# Patient Record
Sex: FEMALE | Race: WHITE | NOT HISPANIC OR LATINO | URBAN - METROPOLITAN AREA
[De-identification: names, ages, dates, MRNs, and addresses within clinical notes are randomized per-mention and may not be internally consistent; named-entity substitution may affect disease eponyms.]

---

## 2017-01-05 ENCOUNTER — TELEPHONE (OUTPATIENT)
Dept: TRANSPLANT | Facility: CLINIC | Age: 42
End: 2017-01-05

## 2017-01-05 DIAGNOSIS — Z00.5 TRANSPLANT DONOR EVALUATION: Primary | ICD-10-CM

## 2017-01-05 NOTE — TELEPHONE ENCOUNTER
Patient called, states she would like to proceed with testing. Required testing discussed and information to schedule appointments provided.     Patient will update pap smear and mammogram with local physician and have results faxed. All questions were answered and patient verbalized understanding.

## 2017-03-07 DIAGNOSIS — Z00.5 TRANSPLANT DONOR EVALUATION: Primary | ICD-10-CM

## 2017-03-08 ENCOUNTER — HOSPITAL ENCOUNTER (OUTPATIENT)
Dept: CARDIOLOGY | Facility: CLINIC | Age: 42
Discharge: HOME | End: 2017-03-08
Payer: COMMERCIAL

## 2017-03-08 ENCOUNTER — HOSPITAL ENCOUNTER (OUTPATIENT)
Dept: RADIOLOGY | Facility: HOSPITAL | Age: 42
Discharge: HOME | End: 2017-03-08
Attending: NURSE PRACTITIONER
Payer: COMMERCIAL

## 2017-03-08 DIAGNOSIS — Z00.5 TRANSPLANT DONOR EVALUATION: ICD-10-CM

## 2017-03-08 DIAGNOSIS — Z00.5 TRANSPLANT DONOR EVALUATION: Primary | ICD-10-CM

## 2017-03-08 LAB
DIASTOLIC DYSFUNCTION: NO
RETIRED EF AND QEF - SEE NOTES: 60 (ref 55–65)

## 2017-03-08 PROCEDURE — 78707 K FLOW/FUNCT IMAGE W/O DRUG: CPT | Mod: 26,,, | Performed by: NUCLEAR MEDICINE

## 2017-03-08 PROCEDURE — A9562 TC99M MERTIATIDE: HCPCS

## 2017-03-08 PROCEDURE — 93321 DOPPLER ECHO F-UP/LMTD STD: CPT | Mod: S$GLB,,, | Performed by: INTERNAL MEDICINE

## 2017-03-08 PROCEDURE — 71020 XR CHEST PA AND LATERAL: CPT | Mod: TC

## 2017-03-08 PROCEDURE — 93351 STRESS TTE COMPLETE: CPT | Mod: S$GLB,,, | Performed by: INTERNAL MEDICINE

## 2017-03-08 PROCEDURE — 71020 XR CHEST PA AND LATERAL: CPT | Mod: 26,,, | Performed by: RADIOLOGY

## 2017-03-09 ENCOUNTER — LAB VISIT (OUTPATIENT)
Dept: LAB | Facility: HOSPITAL | Age: 42
End: 2017-03-09
Attending: NURSE PRACTITIONER
Payer: COMMERCIAL

## 2017-03-09 ENCOUNTER — OFFICE VISIT (OUTPATIENT)
Dept: TRANSPLANT | Facility: CLINIC | Age: 42
End: 2017-03-09
Payer: COMMERCIAL

## 2017-03-09 ENCOUNTER — DOCUMENTATION ONLY (OUTPATIENT)
Dept: TRANSPLANT | Facility: CLINIC | Age: 42
End: 2017-03-09

## 2017-03-09 ENCOUNTER — OFFICE VISIT (OUTPATIENT)
Dept: PSYCHIATRY | Facility: CLINIC | Age: 42
End: 2017-03-09
Payer: COMMERCIAL

## 2017-03-09 VITALS
BODY MASS INDEX: 27.46 KG/M2 | OXYGEN SATURATION: 100 % | DIASTOLIC BLOOD PRESSURE: 78 MMHG | WEIGHT: 155 LBS | RESPIRATION RATE: 16 BRPM | HEART RATE: 68 BPM | SYSTOLIC BLOOD PRESSURE: 115 MMHG | TEMPERATURE: 98 F | HEIGHT: 63 IN

## 2017-03-09 DIAGNOSIS — F31.9 BIPOLAR I DISORDER, MOST RECENT EPISODE (OR CURRENT) UNSPECIFIED: ICD-10-CM

## 2017-03-09 DIAGNOSIS — F42.9 OBSESSIVE-COMPULSIVE DISORDER, UNSPECIFIED TYPE: ICD-10-CM

## 2017-03-09 DIAGNOSIS — Z52.4 KIDNEY DONOR: ICD-10-CM

## 2017-03-09 DIAGNOSIS — E66.3 OVERWEIGHT (BMI 25.0-29.9): ICD-10-CM

## 2017-03-09 DIAGNOSIS — Z00.5 TRANSPLANT DONOR EVALUATION: Primary | ICD-10-CM

## 2017-03-09 DIAGNOSIS — Z00.5 TRANSPLANT DONOR EVALUATION: ICD-10-CM

## 2017-03-09 DIAGNOSIS — F31.9 BIPOLAR 1 DISORDER: ICD-10-CM

## 2017-03-09 DIAGNOSIS — R76.8 HCV ANTIBODY POSITIVE: ICD-10-CM

## 2017-03-09 DIAGNOSIS — Z01.818 PRE-OP EXAM: Primary | ICD-10-CM

## 2017-03-09 PROCEDURE — 99205 OFFICE O/P NEW HI 60 MIN: CPT | Mod: S$GLB,,, | Performed by: INTERNAL MEDICINE

## 2017-03-09 PROCEDURE — 96102 PR PSYCHOLOGIC TESTING BY TECHNICIAN: CPT | Mod: 59,S$GLB,, | Performed by: PSYCHOLOGIST

## 2017-03-09 PROCEDURE — 36415 COLL VENOUS BLD VENIPUNCTURE: CPT

## 2017-03-09 PROCEDURE — 1160F RVW MEDS BY RX/DR IN RCRD: CPT | Mod: S$GLB,,, | Performed by: INTERNAL MEDICINE

## 2017-03-09 PROCEDURE — 99999 PR PBB SHADOW E&M-EST. PATIENT-LVL III: CPT | Mod: PBBFAC,,,

## 2017-03-09 PROCEDURE — 87522 HEPATITIS C REVRS TRNSCRPJ: CPT

## 2017-03-09 PROCEDURE — 90791 PSYCH DIAGNOSTIC EVALUATION: CPT | Mod: S$GLB,,, | Performed by: PSYCHOLOGIST

## 2017-03-09 PROCEDURE — 96101 PR PSYCHOLOGIC TESTING BY PSYCH/PHYS: CPT | Mod: 59,S$GLB,, | Performed by: PSYCHOLOGIST
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
## 2017-03-09 NOTE — PROGRESS NOTES
PHARM.D. PRE-TRANSPLANT NOTE:    This patient's medication therapy was evaluated as part of her pre-transplant donor evaluation.    The following pharmacologic concerns were noted: none (patient states she is no longer taking any medications)      No current outpatient prescriptions on file.     No current facility-administered medications for this visit.          Currently Ms Camp is responsible for preparing / administering this patient's medications on a daily basis.  I am available for consultation and can be contacted, as needed by the other members of the Kidney Transplant team.

## 2017-03-09 NOTE — NURSING
Labs and hx reviewed.  No nutrition intervention required at this time.      Kaylen Escalante, MS RD LDN
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
## 2017-03-10 LAB
HCV LOG: <1.08 LOG (10) IU/ML
HCV RNA QUANT PCR: <12 IU/ML
HCV, QUALITATIVE: NOT DETECTED IU/ML

## 2017-03-10 NOTE — PROGRESS NOTES
"DONOR TEACHING NOTE    Met with Yvette Camp today in clinic to review living donor education.        Topics covered included:  · Kidney donation is done voluntarily and of the donor's free will.  Process can stop at any time.   · Better success rates than cadaveric donation, shorter waiting time for the recipient: less than the 3-5 year wait on the list, more time to prepare: tests/surgery can be planned  · Laboratory studies of blood and urine.  Health exams: records of GYN/pap/mammogram (females); evaluation by transplant team and a psychiatrist (if indicated); diagnostic Tests: CXR, EKG, TB skin test, 24-hour urine collection, renal scan, CT of abdomen to assess kidney anatomy, and stress test (if indicated)  · Team will review workup for approval.  Copy of the approved criteria for donation given to patient.  Surgeon will decide which kidney will be donated.   · Benefits of laparoscopic nephrectomy: less pain, shorter hospital stay, a shorter recovery period.  Risks discussed: bleeding, deep vein thrombosis, pulmonary embolus, the need for re-operation.  Your operation may be converted to an "open" procedure if the surgeon feels it is medically necessary.  · To recovery room, transfer to TSU, if space allows or semi-private room.  Haque catheter/IV, average hospital stay is 1 day.  At discharge the cost of any prescriptions is the donor's responsibility.  · Post-operative visit 4 weeks after surgery or prior to returning to work, unless a complication arises and you need to be seen sooner.  Off of work for about 3 to 6 weeks, no driving for at least the first 3 weeks.  General surgical complications: infection, allergic reaction, and anesthesia.   · Long life considerations of living with one kidney: risk of HTN and kidney failure   · Following up with PCP 6 months after donation then yearly thereafter to monitor kidney function.  This should include weight, labs, urinalysis, and a blood pressure check.  We " are required to report your progress to [facility] at 6 months, 1 year, and 2 years post-donation.     Discussed low normal GFR and the need for further evaluation of kidney function. Will cancel CT scan, surgeon, and advocate until results of psychiatry and Hepatitis testing available. Patient agreed.   Written educational material provided. Informed consent reviewed and signed. All questions were answered and patient verbalized understanding.     Patient is a marginal candidate for living kidney donation.

## 2017-03-12 ENCOUNTER — TELEPHONE (OUTPATIENT)
Dept: PSYCHIATRY | Facility: CLINIC | Age: 42
End: 2017-03-12
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
## 2017-03-12 NOTE — TELEPHONE ENCOUNTER
Report of Psychological Evaluation is completed. It can be accessed through the Chart Review activity under the Notes tab (11th tab to the right of the Encounters tab).  It is titled Psych Testing.    She is a poor candidate for kidney donation from the psychological perspective.   Thanks. EC
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
## 2017-03-17 ENCOUNTER — COMMITTEE REVIEW (OUTPATIENT)
Dept: TRANSPLANT | Facility: CLINIC | Age: 42
End: 2017-03-17

## 2017-03-17 NOTE — COMMITTEE REVIEW
Yvette Jarquinchet was presented at selection committee on 3/17/17 . Patient did not meet selection criteria for living kidney donation. Per psychiatry, patient is a poor candidate due to history and current emotional instability.    Patient notified of committee decision. Patient strongly encouraged to seek psychiatric care locally to address her emotional problems. Patient agreed.  Recipient notified of committee decision per patient request.     Note written by Jodi Smith RN    ================================================================  I was present at the meeting and attest to the decision of the committee.